# Patient Record
Sex: FEMALE | Race: WHITE | NOT HISPANIC OR LATINO | URBAN - METROPOLITAN AREA
[De-identification: names, ages, dates, MRNs, and addresses within clinical notes are randomized per-mention and may not be internally consistent; named-entity substitution may affect disease eponyms.]

---

## 2017-04-12 ENCOUNTER — APPOINTMENT (RX ONLY)
Dept: URBAN - METROPOLITAN AREA CLINIC 141 | Facility: CLINIC | Age: 57
Setting detail: DERMATOLOGY
End: 2017-04-12

## 2017-04-12 DIAGNOSIS — L50.1 IDIOPATHIC URTICARIA: ICD-10-CM

## 2017-04-12 DIAGNOSIS — L40.0 PSORIASIS VULGARIS: ICD-10-CM

## 2017-04-12 PROCEDURE — ? COUNSELING

## 2017-04-12 PROCEDURE — ? TREATMENT REGIMEN

## 2017-04-12 PROCEDURE — 99213 OFFICE O/P EST LOW 20 MIN: CPT

## 2017-04-12 PROCEDURE — ? PRESCRIPTION

## 2017-04-12 RX ORDER — CLOBETASOL PROPIONATE 0.5 MG/G
CREAM TOPICAL
Qty: 1 | Refills: 11 | Status: ERX

## 2017-04-12 ASSESSMENT — LOCATION DETAILED DESCRIPTION DERM
LOCATION DETAILED: LEFT KNEE
LOCATION DETAILED: RIGHT SUPERIOR UPPER BACK
LOCATION DETAILED: RIGHT KNEE

## 2017-04-12 ASSESSMENT — LOCATION ZONE DERM
LOCATION ZONE: LEG
LOCATION ZONE: TRUNK

## 2017-04-12 ASSESSMENT — LOCATION SIMPLE DESCRIPTION DERM
LOCATION SIMPLE: LEFT KNEE
LOCATION SIMPLE: RIGHT UPPER BACK
LOCATION SIMPLE: RIGHT KNEE

## 2017-04-20 ENCOUNTER — RX ONLY (OUTPATIENT)
Age: 57
Setting detail: RX ONLY
End: 2017-04-20

## 2017-04-20 RX ORDER — MONTELUKAST SODIUM 10 MG/1
TABLET, FILM COATED ORAL
Qty: 30 | Refills: 11 | Status: ERX | COMMUNITY
Start: 2017-04-20

## 2017-05-09 ENCOUNTER — RX ONLY (OUTPATIENT)
Age: 57
Setting detail: RX ONLY
End: 2017-05-09

## 2017-05-09 RX ORDER — PREDNISONE 10 MG/1
TABLET ORAL
Qty: 42 | Refills: 0 | Status: ERX | COMMUNITY
Start: 2017-05-09

## 2017-06-27 ENCOUNTER — APPOINTMENT (RX ONLY)
Dept: URBAN - METROPOLITAN AREA CLINIC 39 | Facility: CLINIC | Age: 57
Setting detail: DERMATOLOGY
End: 2017-06-27

## 2017-06-27 DIAGNOSIS — L20.89 OTHER ATOPIC DERMATITIS: ICD-10-CM | Status: INADEQUATELY CONTROLLED

## 2017-06-27 PROBLEM — L20.84 INTRINSIC (ALLERGIC) ECZEMA: Status: ACTIVE | Noted: 2017-06-27

## 2017-06-27 PROCEDURE — ? PRESCRIPTION

## 2017-06-27 PROCEDURE — 99214 OFFICE O/P EST MOD 30 MIN: CPT

## 2017-06-27 PROCEDURE — ? COUNSELING

## 2017-06-27 PROCEDURE — ? TREATMENT REGIMEN

## 2017-06-27 RX ORDER — HYDROXYZINE HYDROCHLORIDE 25 MG/1
TABLET, FILM COATED ORAL QHS
Qty: 60 | Refills: 1 | Status: ERX | COMMUNITY
Start: 2017-06-27

## 2017-06-27 RX ORDER — CLOBETASOL PROPIONATE 0.5 MG/ML
SOLUTION TOPICAL
Qty: 1 | Refills: 1 | Status: ERX | COMMUNITY
Start: 2017-06-27

## 2017-06-27 RX ADMIN — HYDROXYZINE HYDROCHLORIDE: 25 TABLET, FILM COATED ORAL at 00:00

## 2017-06-27 RX ADMIN — CLOBETASOL PROPIONATE: 0.5 SOLUTION TOPICAL at 00:00

## 2017-06-27 ASSESSMENT — LOCATION ZONE DERM: LOCATION ZONE: ARM

## 2017-06-27 ASSESSMENT — LOCATION SIMPLE DESCRIPTION DERM
LOCATION SIMPLE: RIGHT FOREARM
LOCATION SIMPLE: LEFT FOREARM

## 2017-06-27 ASSESSMENT — LOCATION DETAILED DESCRIPTION DERM
LOCATION DETAILED: LEFT PROXIMAL DORSAL FOREARM
LOCATION DETAILED: RIGHT DISTAL DORSAL FOREARM

## 2017-06-27 NOTE — PROCEDURE: TREATMENT REGIMEN
Discontinue Regimen: Benadryl QHS
Detail Level: Zone
Initiate Treatment: hydroxyzine 25 mg tablet: 1 tab po qhs prn for itching\\nclobetasol 0.05 % topical solution: AAA as directed BID up to 4 weeks (Advised to mix into a fresh tub of CeraVe Moisturizing Cream prior to first application)
Continue Regimen: Zyrtec QAM\\nZantac BID

## 2019-05-16 ENCOUNTER — APPOINTMENT (RX ONLY)
Dept: URBAN - METROPOLITAN AREA CLINIC 141 | Facility: CLINIC | Age: 59
Setting detail: DERMATOLOGY
End: 2019-05-16

## 2019-05-16 DIAGNOSIS — L40.0 PSORIASIS VULGARIS: ICD-10-CM

## 2019-05-16 PROCEDURE — ? PRESCRIPTION

## 2019-05-16 PROCEDURE — ? COUNSELING

## 2019-05-16 PROCEDURE — 99212 OFFICE O/P EST SF 10 MIN: CPT

## 2019-05-16 RX ORDER — CALCIPOTRIENE 50 UG/G
OINTMENT TOPICAL BID
Qty: 1 | Refills: 3 | Status: ERX | COMMUNITY
Start: 2019-05-16

## 2019-05-16 RX ORDER — CLOBETASOL PROPIONATE 0.5 MG/G
CREAM TOPICAL BID
Qty: 1 | Refills: 3 | Status: ERX

## 2019-05-16 RX ADMIN — CALCIPOTRIENE: 50 OINTMENT TOPICAL at 15:25

## 2019-05-16 ASSESSMENT — LOCATION SIMPLE DESCRIPTION DERM
LOCATION SIMPLE: LEFT KNEE
LOCATION SIMPLE: RIGHT KNEE

## 2019-05-16 ASSESSMENT — LOCATION DETAILED DESCRIPTION DERM
LOCATION DETAILED: RIGHT KNEE
LOCATION DETAILED: LEFT KNEE

## 2019-05-16 ASSESSMENT — LOCATION ZONE DERM: LOCATION ZONE: LEG

## 2022-02-09 NOTE — PROCEDURE: TREATMENT REGIMEN
Plan: Lab slip was sent with the patient: CBC, HAI, TPO Abs, TSH w/ reflex T4, Total complement CH50, Complement C3 and C4
Detail Level: Zone
Initiate Treatment: re-start Zyrtec and Zantac bid
Detail Level: Generalized
Action 4: Continue
Continue Regimen: clobetasol cream; AAA BID up to 2 weeks PRN flares.  Rest 1 week and repeat as needed.
Statement Selected